# Patient Record
Sex: FEMALE | Race: WHITE | Employment: FULL TIME | URBAN - METROPOLITAN AREA
[De-identification: names, ages, dates, MRNs, and addresses within clinical notes are randomized per-mention and may not be internally consistent; named-entity substitution may affect disease eponyms.]

---

## 2021-02-11 ENCOUNTER — OFFICE VISIT (OUTPATIENT)
Dept: INFECTIOUS DISEASES | Age: 21
End: 2021-02-11
Payer: COMMERCIAL

## 2021-02-11 VITALS
HEART RATE: 73 BPM | OXYGEN SATURATION: 98 % | SYSTOLIC BLOOD PRESSURE: 110 MMHG | BODY MASS INDEX: 23.52 KG/M2 | TEMPERATURE: 98.7 F | WEIGHT: 137.8 LBS | DIASTOLIC BLOOD PRESSURE: 72 MMHG | HEIGHT: 64 IN | RESPIRATION RATE: 16 BRPM

## 2021-02-11 DIAGNOSIS — R42 DIZZINESS: ICD-10-CM

## 2021-02-11 DIAGNOSIS — B37.81 CANDIDAL ESOPHAGITIS (HCC): ICD-10-CM

## 2021-02-11 DIAGNOSIS — J45.40 MODERATE PERSISTENT ASTHMA, UNSPECIFIED WHETHER COMPLICATED: Primary | ICD-10-CM

## 2021-02-11 DIAGNOSIS — R63.4 WEIGHT LOSS, NON-INTENTIONAL: ICD-10-CM

## 2021-02-11 DIAGNOSIS — B37.9 CANDIDA ALBICANS INFECTION: ICD-10-CM

## 2021-02-11 PROCEDURE — 99203 OFFICE O/P NEW LOW 30 MIN: CPT | Performed by: INTERNAL MEDICINE

## 2021-02-11 RX ORDER — ALBUTEROL SULFATE 90 UG/1
AEROSOL, METERED RESPIRATORY (INHALATION)
COMMUNITY
Start: 2020-10-19

## 2021-02-11 RX ORDER — ALBUTEROL SULFATE 2.5 MG/3ML
SOLUTION RESPIRATORY (INHALATION)
COMMUNITY

## 2021-02-11 RX ORDER — MONTELUKAST SODIUM 10 MG/1
10 TABLET ORAL DAILY
Qty: 30 TABLET | Refills: 3 | Status: SHIPPED | OUTPATIENT
Start: 2021-02-11 | End: 2021-02-11 | Stop reason: SINTOL

## 2021-02-11 RX ORDER — CALCIUM CARBONATE 750 MG/1
300 TABLET, CHEWABLE ORAL PRN
COMMUNITY

## 2021-02-11 RX ORDER — NEBULIZER ACCESSORIES
EACH MISCELLANEOUS
COMMUNITY

## 2021-02-11 RX ORDER — MONTELUKAST SODIUM 5 MG/1
10 TABLET, CHEWABLE ORAL NIGHTLY
Qty: 30 TABLET | Refills: 3 | Status: SHIPPED | OUTPATIENT
Start: 2021-02-11

## 2021-02-11 NOTE — PROGRESS NOTES
[facility] - Initial Office Consult Note  Today's Date and Time: 2/14/2021, 5:12 PM    Diagnostic Impression :     1. Moderate persistent asthma, unspecified whether complicated    2. Candida albicans infection    3. Candidal esophagitis ([address]    4. Weight loss, non-intentional    5. Dizziness        Recommendations   · Diflucan 200 mg p.o. daily x14 days  · Suggest consider discontinuing Advair and replacing with Singulair 10 mg a day plus albuterol rescue inhaler as a means of avoiding the steroid effect from Advair that seems to be triggering the recurrent Candida infections. · If unsuccessful may need immunologic work up to exclude chronic mucocutaneous candidiasis    Chief complaint/reason for consultation:     Chief Complaint   Patient presents with   [address] [facility] Patient     having trouble spitting up candida and having trouble swollowing and  breathing        History of Present Illness:   Arpita Rhodes is a 23y.o.-year-old  female who was initially evaluated on 2/11/2021. Patient seen at the request of Mathew Gottlieb. INITIAL HISTORY:    The patient indicates that she started to develop symptoms of asthma about a year and a half ago. She started treatment with Advair plus albuterol rescue inhaler at about that time. She has followed with Dr. Maritza Rivera in [facility] for asthma management. She indicates that she takes her Advair at night and does not regularly rinse her mouth after taking the inhaler. She has noticed the development of white plaques in the mouth and pharynx. These areas have been painful and have prevented her from taking food on a regular basis,. As a consequence she has lost 100 pounds in a year and a half. She has seen Dr Ashley Perez for a gastroenterology evaluation. She received two courses of Diflucan x 14 days each after checking her QTc interval for safety of administration of Diflucan.      Dr Ela Loja has requested she be evaluated to determine the reason for the recurrent fungal infections. The patient indicates that she is mainly troubled by formation of phlegm that she expectorates and she considers as part of the fungal infection. She shared pictures of the phlegm which show clear phlegm with small areas of yellowish mucous. I indicated to the patient and her family member that this type of expectoration likely represents chronic bronchitis and is not indicative of a Candida infection of the bronchi. Her throat shows some chronic irritation. I could not appreciate fungal patches on this visit. She also complained of intermittent dizziness when upright but no true syncope. Her BP in the office was 90/60 mm Hg. She also reported a prior skin rash that had been present before her first treatment with Diflucan and that went away with the Diflucan. She denied prior fungal infections of her nails, prior persistent infections of the skin and mucous  membranes. No prior Hx of T cell dysfunction with chronic mucocutaneous candidiasis. DISCUSSION:    Patient with several problems:  1. Hx of recurrent Candida infections in the back of the throat and esophagus. 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 She has received two courses of treatment with Diflucan and presents with concerns with another recurrence. · The cause for the recurrences may be simply secondary to the steroid effect from her Advair inhaler. She does not rinse her mouth after using the inhaler. · She has been asked to stop the inhaler and use Singulair capsules instead. She will contact Dr Kiara Moe who manages her asthma to [facility] such change. · She has been tested for HIV and is negative. She does not have uncontrolled DM, hypothyroidism. She has no Hx to suggest a chronic immune deficiency that would lead to T-cell dysfunction and chronic mucocutaneous candidiasis. However, I have indicated that if stopping Advair and taking a third course of Diflucan does not solve the problem, additional immunology testing may be necessary. 2. Weight loss  · The patient reports about 100 lb weight loss which she attributes to not eating because of pain with swallowing. · Her EGD on 12-4-20 showed normal biopsies of duododenum, stomach. She had some mild esophagitis secondary to Candida and reflux. · If her report is correct she will need monitoring of her weight, caloric intake and consideration of a work up to determine the cause of the weight loss. 3. Possible postural hypotension  · The patient also relates episodes of dizziness with upright posture. She has not described syncope per se. She will need monitoring for these complaints. If persistent she may need a Tilt table study to exclude POTS. PLAN:   · Diflucan 200 mg p.o. daily x14 days  · Suggest consider discontinuing Advair and replacing with Singulair 10 mg a day plus albuterol rescue inhaler as a means of avoiding the steroid effect from Advair that seems to be triggering the recurrent Candida infections. · If not successful she may need an immunologic work up to exclude T cell dysfunction. Patient seen face to face for a period of 30 min, of which more than 50% of the time was spent on counseling, explanation of diagnosis, planning of further management, and answering all questions  . I have personally reviewed the past medical history, past surgical history, medications, social history, and family history, and I have updated the database accordingly. Past Medical History:     Past Medical History:   Diagnosis Date    Asthma     Candida infection        Past Surgical  History:   History reviewed. No pertinent surgical history.     Medications:     Current Outpatient Medications:     fluticasone-salmeterol (ADVAIR DISKUS) 500-50 MCG/DOSE diskus inhaler, 1 puff, Disp: , Rfl:     albuterol (PROVENTIL) (2.5 MG/3ML) 0.083% nebulizer solution, 3 ml as needed, Disp: , Rfl:     Respiratory Therapy Supplies (NEBULIZER AIR TUBE/PLUGS) MISC, as directed, Disp: , Rfl:     albuterol sulfate HFA (PROAIR HFA) 108 (90 Base) MCG/ACT inhaler, 2 puff as needed, Disp: , Rfl:     calcium carbonate (TUMS EX) 750 MG chewable tablet, Take 300 mg by mouth as needed, Disp: , Rfl:     montelukast (SINGULAIR) 5 MG chewable tablet, Take 2 tablets by mouth nightly, Disp: 30 tablet, Rfl: 3     Social History:     Social History     Socioeconomic History    Marital status: Unknown     Spouse name: Not on file    Number of children: Not on file    Years of education: Not on file    Highest education level: Not on file   Occupational History    Not on file   Social Needs    Financial resource strain: Not on file    Food insecurity     Worry: Not on file     Inability: Not on file    Transportation needs     Medical: Not on file     Non-medical: Not on file   Tobacco Use    Smoking status: Never Smoker    Smokeless tobacco: Never Used   Substance and Sexual Activity    Alcohol use: Not on file    Drug use: Not on file    Sexual activity: Not on file   Lifestyle    Physical activity     Days per week: Not on file     Minutes per session: Not on file    Stress: Not on file   Relationships    Social connections     Talks on phone: Not on file     Gets together: Not on file     Attends Confucianism service: Not on file     Active member of club or organization: Not on file     Attends meetings of clubs or organizations: Not on file     Relationship status: Not on file    Intimate partner violence     Fear of current or ex partner: Not on file     Emotionally abused: Not on file     Physically abused: Not on file     Forced sexual activity: Not on file   Other Topics Concern    Not on file   Social History Narrative    Not on file       Family History:   History reviewed. No pertinent family history.      Allergies:   Omeprazole Review of Systems:   Constitutional: No fevers or chills. No systemic complaints other than weight loss  Head: No headaches  Eyes: No double vision or blurry vision. ENT: No sore throat or runny nose. . No hearing loss, tinnitus or vertigo. Cardiovascular: No chest pain or palpitations. No shortness of breath. No STANTON  Lung: No shortness of breath or cough. No sputum production  Abdomen: No nausea, vomiting, diarrhea> Reports abdominal pain with food intake. Genitourinary: No increased urinary frequency, or dysuria. No hematuria. No suprapubic or CVA pain  Musculoskeletal: No muscle aches or pains. No joint effusions, swelling or deformities  Hematologic: No bleeding or bruising. Neurologic: No headache, weakness, numbness, or tingling. Reports dizziness in upright position    Physical Examination :   /72 (Site: Right Upper Arm, Position: Sitting, Cuff Size: Medium Adult)   Pulse 73   Temp 98.7 °F (37.1 °C) (Temporal)   Resp 16   Ht 5' 4\" (1.626 m)   Wt 137 lb 12.8 oz (62.5 kg)   SpO2 98% Comment: room air at rest  BMI 23.65 kg/m²    General Appearance: Awake, alert, and in no apparent distress  Head:  Normocephalic, no trauma  Eyes: Pupils equal, round, reactive, to light and accommodation; extraocular movements intact; sclera anicteric; conjunctivae pink. No embolic phenomena. ENT: Oropharynx clear, without erythema, exudate, or thrush. No tenderness of sinuses. Mouth/throat: mucosa pink and moist. No lesions. Dentition in good repair. Neck:Supple, without lymphadenopathy. Thyroid normal, No bruits. Pulmonary/Chest: Clear to auscultation, without wheezes, rales, or rhonchi. No dullness to percussion. Cardiovascular: Regular rate and rhythm without murmurs, rubs, or gallops. Abdomen: Soft, non tender. Bowel sounds normal. No organomegaly  All four Extremities: No cyanosis, clubbing, edema, or effusions. Neurologic: No gross sensory or motor deficits.   Skin: Warm and dry with good turgor. No signs of peripheral arterial or venous insufficiency. Medical Decision Making:   I have independently reviewed/ordered the following labs:    CBC with Differential:  No results found for: WBC, HGB, HCT, PLT, SEGSPCT, BANDSPCT, LYMPHOPCT, MONOPCT, EOSPCT  BMP:   No results found for: NA, K, CL, CO2, BUN, CREATININE, MG  Hepatic Function Panel:  No results found for: PROT, LABALBU, BILIDIR, IBILI, BILITOT, ALKPHOS, ALT, AST  No results found for: RPR  No results found for: HIV  No results found for: BC  No results found for: MUCUS, PH, RBC, TRICHOMONAS, WBC, YEAST, TURBIDITY  No results found for: CREATININE, GLUCOSE, KETONES    Thank you for allowing us to participate in the care of this patient. Please call with questions.     Regine Rivera MD  Pager: [phone] - Office: [phone]

## 2021-02-12 ENCOUNTER — TELEPHONE (OUTPATIENT)
Dept: INFECTIOUS DISEASES | Age: 21
End: 2021-02-12

## 2021-02-17 NOTE — TELEPHONE ENCOUNTER
Received a Denial on pt's singulair. I phoned the pt informed her this has been denied and that this should be going through her pulmonary dr or PCP in infectious disease that is why it is denied. I phoned [facility] pharmacy spoke with Sruthi Castillo informed her that this medication needs to be sent to her PCP or pulmonary dr because I will not be able to get this approved because we have no records about her asthma.

## 2021-02-18 ENCOUNTER — TELEPHONE (OUTPATIENT)
Dept: INFECTIOUS DISEASES | Age: 21
End: 2021-02-18

## 2021-03-08 PROBLEM — B37.81 CANDIDA INFECTION, ESOPHAGEAL (HCC): Status: ACTIVE | Noted: 2021-03-08

## 2021-03-08 NOTE — TELEPHONE ENCOUNTER
Pt hasn't started new Rx as of yet - wanted to be sure she could take with Advair. She is going to continue using inhaler rather than Singulair.

## 2021-04-19 NOTE — PROGRESS NOTES
courses of Diflucan x 14 days each after checking her QTc interval for safety of administration of Diflucan. Dr Davian Sun has requested she be evaluated to determine the reason for the recurrent fungal infections. The patient indicates that she is mainly troubled by formation of phlegm that she expectorates and she considers as part of the fungal infection. She shared pictures of the phlegm which show clear phlegm with small areas of yellowish mucous. I indicated to the patient and her family member that this type of expectoration likely represents chronic bronchitis and is not indicative of a Candida infection of the bronchi. I could not appreciate fungal patches on this visit. She also complained of intermittent dizziness when upright but no true syncope. Her BP in the office was 90/60 mm Hg. She also reported a prior skin rash that had been present before her first treatment with Diflucan and that went away with the Diflucan. She denied prior fungal infections of her nails, prior persistent infections of the skin and mucous  membranes. No prior Hx of T cell dysfunction with chronic mucocutaneous candidiasis. CURRENT EVALUATION : 4/20/2021    The patient was seen in the office on 4/20/2021 accompanied by a family member. She indicates that she continues to have difficulty clearing secretions from her airways. At times she has thick mucus that is expectorated. She gets very concerned and afraid when this occurs because of difficulty breathing. She also has problems with constant production of saliva and other mucus from her airways. She saw Dr. Filomena Kawasaki for her asthma management on 4/19/2021. He requested that she use the Advair with a spacer. The patient is concerned with the steroid component of the medication and the effect it would have on further Candida stomatitis/esophagitis. She requested to be seen in the office for this reason.   She indicated that she had failed to use the nystatin the throat and esophagus. 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 She has received two courses of treatment with Diflucan and presents with concerns with another recurrence. · The cause for the recurrences may be simply secondary to the steroid effect from her Advair inhaler. She does not rinse her mouth after using the inhaler. · She has been asked to stop the inhaler and use Singulair capsules instead. She was to contact Dr Neema Dozier who manages her asthma to [facility] such change. No change in her inhaler has occurred. · She has been tested for HIV and is negative. She does not have uncontrolled DM, hypothyroidism. She has no Hx to suggest a chronic immune deficiency that would lead to T-cell dysfunction and chronic mucocutaneous candidiasis. However, I have indicated that if stopping Advair and taking a third course of Diflucan does not solve the problem, additional immunology testing may be necessary. 2. Weight loss  · The patient reports about 100 lb weight loss which she attributes to not eating because of pain with swallowing. · Her EGD on 12-4-20 showed normal biopsies of duododenum, stomach. She had some mild esophagitis secondary to Candida and reflux. · If her report is correct she will need monitoring of her weight, caloric intake and consideration of a work up to determine the cause of the weight loss. 3. Possible postural hypotension  · The patient also relates episodes of dizziness with upright posture. She has not described syncope per se. She will need monitoring for these complaints. · She has undergone a cardiology evaluation including a tilt table study which apparently have not shown pots syndrome.   ·   PLAN:     · I had initially suggested consider discontinuing Advair and replacing with Singulair 10 mg a day plus albuterol rescue inhaler as a means of avoiding the steroid effect from Advair that seems to be triggering the recurrent Candida infections. · For several reasons the patient has not been able to accomplish the above. I suggested having her see Dr. Lelia Payan for a second opinion concerning the management of the asthma see if avoidance of the steroids can be achieved. · I have also suggested nutritional/dietary evaluation to help improve her nutritional intake in view of the weight loss of over 100 pounds that she has sustained. Patient seen face to face for a period of 30 min, of which more than 50% of the time was spent on counseling, explanation of diagnosis, planning of further management, and answering all questions  . I have personally reviewed the past medical history, past surgical history, medications, social history, and family history, and I have updated the database accordingly. Past Medical History:     Past Medical History:   Diagnosis Date    Asthma     Candida infection        Past Surgical  History:   History reviewed. No pertinent surgical history.     Medications:     Current Outpatient Medications:     lansoprazole (PREVACID SOLUTAB) 30 MG disintegrating tablet, Take 30 mg by mouth daily, Disp: , Rfl:     fluticasone-salmeterol (ADVAIR DISKUS) 500-50 MCG/DOSE diskus inhaler, 1 puff, Disp: , Rfl:     albuterol (PROVENTIL) (2.5 MG/3ML) 0.083% nebulizer solution, 3 ml as needed, Disp: , Rfl:     Respiratory Therapy Supplies (NEBULIZER AIR TUBE/PLUGS) MISC, as directed, Disp: , Rfl:     albuterol sulfate HFA (PROAIR HFA) 108 (90 Base) MCG/ACT inhaler, 2 puff as needed, Disp: , Rfl:     calcium carbonate (TUMS EX) 750 MG chewable tablet, Take 300 mg by mouth as needed, Disp: , Rfl:     montelukast (SINGULAIR) 5 MG chewable tablet, Take 2 tablets by mouth nightly (Patient not taking: Reported on 4/20/2021), Disp: 30 tablet, Rfl: 3     Social History:     Social History     Socioeconomic History    Marital status: Unknown     Spouse name: Not on file    Number of children: Not on file    Years of education: Not on file    Highest education level: Not on file   Occupational History    Not on file   Social Needs    Financial resource strain: Not on file    Food insecurity     Worry: Not on file     Inability: Not on file    Transportation needs     Medical: Not on file     Non-medical: Not on file   Tobacco Use    Smoking status: Former Smoker     Packs/day: 1.50     Years: 6.00     Pack years: 9.00     Types: Cigarettes     Quit date: 2019     Years since quittin.7    Smokeless tobacco: Never Used   Substance and Sexual Activity    Alcohol use: Yes     Alcohol/week: 2.0 standard drinks     Types: 2 Shots of liquor per week     Frequency: Never     Comment: once every two months    Drug use: Yes     Types: Marijuana     Comment: Everyday    Sexual activity: Yes     Partners: Male   Lifestyle    Physical activity     Days per week: Not on file     Minutes per session: Not on file    Stress: Not on file   Relationships    Social connections     Talks on phone: Not on file     Gets together: Not on file     Attends Muslim service: Not on file     Active member of club or organization: Not on file     Attends meetings of clubs or organizations: Not on file     Relationship status: Not on file    Intimate partner violence     Fear of current or ex partner: Not on file     Emotionally abused: Not on file     Physically abused: Not on file     Forced sexual activity: Not on file   Other Topics Concern    Not on file   Social History Narrative    Not on file       Family History:     Family History   Problem Relation Age of Onset    Anemia Mother     Other Mother     High Blood Pressure Father     Heart Disease Father     Cancer Paternal Uncle     Cancer Paternal Grandmother         Allergies:   Omeprazole     Review of Systems:   Constitutional: No fevers or chills.  No systemic complaints other than weight loss  Head: No headaches  Eyes: No double vision or blurry vision. ENT: No sore throat or runny nose. . No hearing loss, tinnitus or vertigo. Cardiovascular: No chest pain or palpitations. No shortness of breath. No STANTON  Lung: No shortness of breath or cough. No sputum production  Abdomen: No nausea, vomiting, diarrhea> Reports abdominal pain with food intake. Genitourinary: No increased urinary frequency, or dysuria. No hematuria. No suprapubic or CVA pain  Musculoskeletal: No muscle aches or pains. No joint effusions, swelling or deformities  Hematologic: No bleeding or bruising. Neurologic: No headache, weakness, numbness, or tingling. Reports dizziness in upright position    Physical Examination :   /62 (Site: Left Upper Arm, Position: Sitting, Cuff Size: Medium Adult)   Pulse 60   Temp 97.1 °F (36.2 °C)   Ht 5' 4\" (1.626 m)   Wt 134 lb 9.6 oz (61.1 kg)   BMI 23.10 kg/m²    General Appearance: Awake, alert, and in no apparent distress  Head:  Normocephalic, no trauma  Eyes: Pupils equal, round, reactive, to light and accommodation; extraocular movements intact; sclera anicteric; conjunctivae pink. No embolic phenomena. ENT: Oropharynx clear, without erythema, exudate, or thrush. No tenderness of sinuses. Mouth/throat: mucosa pink and moist. No lesions. Dentition in good repair. White discoloration of the tongue but without a removable coating that would indicate Candida  Neck:Supple, without lymphadenopathy. Thyroid normal, No bruits. Pulmonary/Chest: Clear to auscultation, without wheezes, rales, or rhonchi. No dullness to percussion. Cardiovascular: Regular rate and rhythm without murmurs, rubs, or gallops. Abdomen: Soft, non tender. Bowel sounds normal. No organomegaly  All four Extremities: No cyanosis, clubbing, edema, or effusions. Neurologic: No gross sensory or motor deficits. Skin: Warm and dry with good turgor. No signs of peripheral arterial or venous insufficiency.     Medical Decision Making:   I have independently reviewed/ordered the following labs:    CBC with Differential:  No results found for: WBC, HGB, HCT, PLT, SEGSPCT, BANDSPCT, LYMPHOPCT, MONOPCT, EOSPCT  BMP:   No results found for: NA, K, CL, CO2, BUN, CREATININE, MG  Hepatic Function Panel:  No results found for: PROT, LABALBU, BILIDIR, IBILI, BILITOT, ALKPHOS, ALT, AST  No results found for: RPR  No results found for: HIV  No results found for: BC  No results found for: MUCUS, PH, RBC, TRICHOMONAS, WBC, YEAST, TURBIDITY  No results found for: CREATININE, GLUCOSE, KETONES    Thank you for allowing us to participate in the care of this patient. Please call with questions.     Sydni Macdonald MD  Pager: [phone] - Office: [phone]

## 2021-04-20 ENCOUNTER — OFFICE VISIT (OUTPATIENT)
Dept: INFECTIOUS DISEASES | Age: 21
End: 2021-04-20
Payer: COMMERCIAL

## 2021-04-20 VITALS
WEIGHT: 134.6 LBS | TEMPERATURE: 97.1 F | HEART RATE: 60 BPM | HEIGHT: 64 IN | DIASTOLIC BLOOD PRESSURE: 62 MMHG | SYSTOLIC BLOOD PRESSURE: 100 MMHG | BODY MASS INDEX: 22.98 KG/M2

## 2021-04-20 DIAGNOSIS — B37.81 CANDIDAL ESOPHAGITIS (HCC): ICD-10-CM

## 2021-04-20 DIAGNOSIS — R63.4 UNEXPLAINED WEIGHT LOSS: Primary | ICD-10-CM

## 2021-04-20 DIAGNOSIS — J45.40 MODERATE PERSISTENT ASTHMA, UNSPECIFIED WHETHER COMPLICATED: ICD-10-CM

## 2021-04-20 PROCEDURE — 99214 OFFICE O/P EST MOD 30 MIN: CPT | Performed by: INTERNAL MEDICINE

## 2021-04-20 RX ORDER — LANSOPRAZOLE 30 MG/1
30 TABLET, ORALLY DISINTEGRATING, DELAYED RELEASE ORAL DAILY
COMMUNITY

## 2021-06-01 ENCOUNTER — TELEPHONE (OUTPATIENT)
Dept: INFECTIOUS DISEASES | Age: 21
End: 2021-06-01

## 2021-06-01 NOTE — TELEPHONE ENCOUNTER
Tried to call the patient to see if she still wants to see the pulmonary doctor and also a nutrient doctor.  LMOM